# Patient Record
Sex: MALE | Race: WHITE | NOT HISPANIC OR LATINO | URBAN - METROPOLITAN AREA
[De-identification: names, ages, dates, MRNs, and addresses within clinical notes are randomized per-mention and may not be internally consistent; named-entity substitution may affect disease eponyms.]

---

## 2025-01-30 ENCOUNTER — OFFICE VISIT (OUTPATIENT)
Dept: URGENT CARE | Age: 25
End: 2025-01-30
Payer: COMMERCIAL

## 2025-01-30 VITALS
BODY MASS INDEX: 21.9 KG/M2 | HEART RATE: 71 BPM | DIASTOLIC BLOOD PRESSURE: 72 MMHG | HEIGHT: 71 IN | TEMPERATURE: 97.8 F | OXYGEN SATURATION: 99 % | RESPIRATION RATE: 16 BRPM | SYSTOLIC BLOOD PRESSURE: 124 MMHG | WEIGHT: 156.4 LBS

## 2025-01-30 DIAGNOSIS — S63.501D WRIST SPRAIN, RIGHT, SUBSEQUENT ENCOUNTER: Primary | ICD-10-CM

## 2025-01-30 ASSESSMENT — PAIN SCALES - GENERAL: PAINLEVEL_OUTOF10: 2
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
## 2025-05-27 ENCOUNTER — OFFICE VISIT (OUTPATIENT)
Dept: URGENT CARE | Age: 25
End: 2025-05-27
Payer: COMMERCIAL

## 2025-05-27 VITALS
SYSTOLIC BLOOD PRESSURE: 127 MMHG | BODY MASS INDEX: 21.9 KG/M2 | DIASTOLIC BLOOD PRESSURE: 81 MMHG | OXYGEN SATURATION: 97 % | HEART RATE: 73 BPM | RESPIRATION RATE: 17 BRPM | WEIGHT: 157 LBS | TEMPERATURE: 97.6 F

## 2025-05-27 DIAGNOSIS — J02.9 SORE THROAT: ICD-10-CM

## 2025-05-27 DIAGNOSIS — R09.82 PND (POST-NASAL DRIP): ICD-10-CM

## 2025-05-27 DIAGNOSIS — J02.9 VIRAL PHARYNGITIS: ICD-10-CM

## 2025-05-27 DIAGNOSIS — B34.8 RHINOVIRUS: Primary | ICD-10-CM

## 2025-05-27 PROBLEM — J35.01 CHRONIC TONSILLITIS: Status: ACTIVE | Noted: 2023-02-27

## 2025-05-27 PROBLEM — R30.0 DYSURIA: Status: ACTIVE | Noted: 2025-05-27

## 2025-05-27 PROBLEM — R62.52 DECREASED GROWTH VELOCITY, HEIGHT: Status: ACTIVE | Noted: 2017-01-03

## 2025-05-27 PROBLEM — N36.8 IRRITATION OF URETHRAL MEATUS: Status: ACTIVE | Noted: 2025-05-27

## 2025-05-27 PROBLEM — R62.51 POOR WEIGHT GAIN IN CHILD: Status: ACTIVE | Noted: 2017-01-03

## 2025-05-27 LAB
POC HUMAN RHINOVIRUS PCR: POSITIVE
POC INFLUENZA A VIRUS PCR: NEGATIVE
POC INFLUENZA B VIRUS PCR: NEGATIVE
POC RESPIRATORY SYNCYTIAL VIRUS PCR: NEGATIVE
POC STREPTOCOCCUS PYOGENES (GROUP A STREP) PCR: NEGATIVE

## 2025-05-27 RX ORDER — FLUTICASONE PROPIONATE 50 MCG
1 SPRAY, SUSPENSION (ML) NASAL DAILY
Qty: 16 G | Refills: 0 | Status: SHIPPED | OUTPATIENT
Start: 2025-05-27 | End: 2025-06-26

## 2025-05-27 RX ORDER — CETIRIZINE HYDROCHLORIDE 10 MG/1
10 TABLET ORAL DAILY
Qty: 30 TABLET | Refills: 0 | Status: SHIPPED | OUTPATIENT
Start: 2025-05-27 | End: 2025-06-26

## 2025-05-27 RX ORDER — BENZOCAINE AND MENTHOL 15; 2.6 MG/1; MG/1
1 LOZENGE ORAL EVERY 2 HOUR PRN
Qty: 50 LOZENGE | Refills: 0 | Status: SHIPPED | OUTPATIENT
Start: 2025-05-27 | End: 2025-06-01

## 2025-05-27 ASSESSMENT — ENCOUNTER SYMPTOMS
NEUROLOGICAL NEGATIVE: 1
FATIGUE: 1
RESPIRATORY NEGATIVE: 1
GASTROINTESTINAL NEGATIVE: 1
COUGH: 0
NAUSEA: 0
CARDIOVASCULAR NEGATIVE: 1
MUSCULOSKELETAL NEGATIVE: 1
EYES NEGATIVE: 1
WHEEZING: 0
RHINORRHEA: 0
CHILLS: 0
SINUS PAIN: 0
VOMITING: 0
FEVER: 0
SHORTNESS OF BREATH: 0
PSYCHIATRIC NEGATIVE: 1
APPETITE CHANGE: 0
SINUS PRESSURE: 0
SORE THROAT: 1
DIARRHEA: 0

## 2025-05-27 NOTE — PROGRESS NOTES
Subjective   Patient ID: Gianluca Soni is a 25 y.o. male. They present today with a chief complaint of Sore Throat (X 5 days).    History of Present Illness  C/o sore throat and cold s/s x 5 day(s). Has tried OTC meds with mild relief. Denies any CP, SOB, HA, fever, abdominal pain, and nausea/vomiting otherwise.      History provided by:  Patient  Sore Throat   Pertinent negatives include no coughing, diarrhea, ear discharge, ear pain, shortness of breath or vomiting.       Past Medical History  Allergies as of 05/27/2025 - Reviewed 05/27/2025   Allergen Reaction Noted    Penicillins Hives and Rash 08/28/2015       Prescriptions Prior to Admission[1]     Medical History[2]    Surgical History[3]     reports that he has never smoked. He has never been exposed to tobacco smoke. He has never used smokeless tobacco. Alcohol use questions deferred to the physician. Drug use questions deferred to the physician.    Review of Systems  Review of Systems   Constitutional:  Positive for fatigue. Negative for appetite change, chills and fever.   HENT:  Positive for postnasal drip and sore throat. Negative for ear discharge, ear pain, rhinorrhea, sinus pressure and sinus pain.    Eyes: Negative.    Respiratory: Negative.  Negative for cough, shortness of breath and wheezing.    Cardiovascular: Negative.    Gastrointestinal: Negative.  Negative for diarrhea, nausea and vomiting.   Musculoskeletal: Negative.    Skin: Negative.    Neurological: Negative.    Psychiatric/Behavioral: Negative.     All other systems reviewed and are negative.                                 Objective    Vitals:    05/27/25 1935   BP: 127/81   Pulse: 73   Resp: 17   Temp: 36.4 °C (97.6 °F)   SpO2: 97%   Weight: 71.2 kg (157 lb)     No LMP for male patient.    Physical Exam  Vitals and nursing note reviewed.   Constitutional:       General: He is not in acute distress.     Appearance: Normal appearance. He is normal weight. He is not ill-appearing,  toxic-appearing or diaphoretic.   HENT:      Head: Normocephalic and atraumatic.      Right Ear: Tympanic membrane and ear canal normal.      Left Ear: Tympanic membrane and ear canal normal.      Nose: Nose normal. No rhinorrhea.      Mouth/Throat:      Mouth: Mucous membranes are moist.      Pharynx: Oropharynx is clear. Posterior oropharyngeal erythema present.   Eyes:      Extraocular Movements: Extraocular movements intact.      Pupils: Pupils are equal, round, and reactive to light.   Cardiovascular:      Rate and Rhythm: Normal rate and regular rhythm.      Pulses: Normal pulses.      Heart sounds: Normal heart sounds.   Pulmonary:      Effort: Pulmonary effort is normal. No respiratory distress.      Breath sounds: Normal breath sounds. No wheezing or rhonchi.   Abdominal:      General: Bowel sounds are normal.      Tenderness: There is no right CVA tenderness or left CVA tenderness.   Skin:     General: Skin is warm and dry.   Neurological:      Mental Status: He is alert and oriented to person, place, and time.   Psychiatric:         Mood and Affect: Mood normal.         Behavior: Behavior normal.         Procedures    Point of Care Test & Imaging Results from this visit  Results for orders placed or performed in visit on 05/27/25   POCT SPOTFIRE R/ST Panel Mini w/Strep [facility] manually resulted   Result Value Ref Range    POC Group A Strep, PCR Negative Negative    POC Respiratory Syncytial Virus PCR Negative Negative    POC Influenza A Virus PCR Negative Negative    POC Influenza B Virus PCR Negative Negative    POC Human Rhinovirus PCR Positive (A) Negative      Imaging  No results found.    Cardiology, Vascular, and Other Imaging  No other imaging results found for the past 2 days      Diagnostic study results (if any) were reviewed by BOB López.    Assessment/Plan   Allergies, medications, history, and pertinent labs/EKGs/Imaging reviewed by BOB López.      Medical Decision Making  Positive Rhinovirus. See results. Presentation consistent with and discussed viral etiology. Did not appreciate any s/s bacterial infection at this time. Sending cetrizine and Flonase for PND. Sending cepacol lozenges. Discussed pushing fluids, rest, OTC symptoms management PRN. Close follow up with PCP. Patient verbalized understanding and agreed with the plan of care.      At time of discharge, patient was clinically well-appearing and appropriate for outpatient management. The patient/parent/guardian was educated regarding diagnosis, supportive care, OTC and Rx medications. The patient/parent/guardian was given the opportunity to ask questions prior to discharge. They verbalized understanding of discussion of treatment plan, expected course of illness and/or injury, indications on when to return to , when to seek further evaluation in ED/call 911, and the need to follow up with PCP and/or specialist as referred. Patient/parent/guardian was provided with work/school documentation if requested. Patient stable upon discharge.      Orders and Diagnoses  Diagnoses and all orders for this visit:  Rhinovirus  Sore throat  -     POCT SPOTFIRE R/ST Panel Mini w/Strep A ([facility]) manually resulted  PND (post-nasal drip)  -     cetirizine (ZyrTEC) 10 mg tablet; Take 1 tablet (10 mg) by mouth once daily.  -     fluticasone (Flonase) 50 mcg/actuation nasal spray; Administer 1 spray into each nostril once daily. Shake gently. Before first use, prime pump. After use, clean tip and replace cap.  Viral pharyngitis  -     benzocaine-menthol (Cepacol Sore Throat, irma-men,) lozenge; Dissolve 1 lozenge in the mouth every 2 hours if needed for sore throat for up to 5 days.      Medical Admin Record      Patient disposition: Home    Electronically signed by Marshall Hurtado, STAS-CNP  8:11 PM       [1] (Not in a hospital admission)   [2]   Past Medical History:  Diagnosis Date    Acute bronchospasm  11/11/2019    Cough due to bronchospasm    Other conditions influencing health status     No significant past medical history    Other viral warts 09/20/2017    Common wart    Personal history of other diseases of the respiratory system 11/04/2019    History of acute bronchitis    Personal history of other diseases of the respiratory system 11/11/2019    History of acute sinusitis    Personal history of other diseases of the respiratory system 02/23/2018    History of acute sinusitis    Personal history of other diseases of the respiratory system 02/06/2017    History of acute sinusitis    Personal history of other infectious and parasitic diseases 03/02/2017    History of candidiasis of mouth    Personal history of other specified conditions 02/06/2017    History of fever    Short stature (child) 08/02/2016    Short stature   [3] History reviewed. No pertinent surgical history.